# Patient Record
Sex: MALE | Race: WHITE | URBAN - METROPOLITAN AREA
[De-identification: names, ages, dates, MRNs, and addresses within clinical notes are randomized per-mention and may not be internally consistent; named-entity substitution may affect disease eponyms.]

---

## 2018-08-14 ENCOUNTER — OFFICE VISIT (OUTPATIENT)
Dept: FAMILY MEDICINE | Facility: CLINIC | Age: 18
End: 2018-08-14
Payer: COMMERCIAL

## 2018-08-14 VITALS
HEART RATE: 71 BPM | BODY MASS INDEX: 18.37 KG/M2 | OXYGEN SATURATION: 99 % | HEIGHT: 69 IN | DIASTOLIC BLOOD PRESSURE: 79 MMHG | WEIGHT: 124 LBS | TEMPERATURE: 98 F | SYSTOLIC BLOOD PRESSURE: 102 MMHG

## 2018-08-14 DIAGNOSIS — Z00.00 ENCOUNTER FOR WELLNESS EXAMINATION IN ADULT: ICD-10-CM

## 2018-08-14 DIAGNOSIS — F15.91 HISTORY OF METHAMPHETAMINE USE: ICD-10-CM

## 2018-08-14 DIAGNOSIS — Z13.9 SCREENING FOR CONDITION: Primary | ICD-10-CM

## 2018-08-14 DIAGNOSIS — Z23 ENCOUNTER FOR IMMUNIZATION: ICD-10-CM

## 2018-08-14 LAB
CHOLEST SERPL-MCNC: 109 MG/DL
FASTING?: YES
HDLC SERPL-MCNC: 42 MG/DL
HIV 1+2 AB+HIV1 P24 AG SERPL QL IA: NEGATIVE
LDLC SERPL CALC-MCNC: 58 MG/DL
TRIGL SERPL-MCNC: 46 MG/DL

## 2018-08-14 NOTE — PATIENT INSTRUCTIONS
Please return to clinic in 2 months and then again in 6 months for the #2 and #3 (final) doses of the HPV vaccine.

## 2018-08-14 NOTE — PROGRESS NOTES
Chief Complaint   Patient presents with     Establish Care     establish care, testing after drug use     Assessment and Plan   1. History of methamphetamine use  Screening for below due to history of injection meth.  - HIV Ag/Ab Screen [place] ([facility])  - Hepatitis C Antibody ([facility])  - Hepatitis B Surface Ag ([facility])  - Hepatitis B Surface Ab ([facility])    2. Screening for condition  - HIV Ag/Ab Screen [place] ([facility])  - Lipid [place] ([facility]) - Results > 1 hr  - Hepatitis B Surface Ab ([facility])    3. Encounter for wellness examination in adult  Patient agreeable to lipid panel while we are drawing blood, not at increased risk. We are starting HPV series today, he will RTC at 2 and 6 months for #2 and #3.    Options for treatment and follow-up care were reviewed with the patient and/or guardian. Marvel Brantley and/or guardian engaged in the decision making process and verbalized understanding of the options discussed and agreed with the final plan.    Naveen Brady MD  [facility], PGY-2    Precepted with Precepted with: Anny Nunez MD         HPI:   Marvel Brantley is a 18 year old male who presents to clinic today for physical exam.    His main concern is screening for HIV and Hepatitis given his history of meth injection. He has been clean for the past 10 months - just quit cold turkey on his own after coming to the realization that meth wasn't good for him. No alcohol or other drug use, just marijuana.    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 His mother unfortunately passed away on hospice, he is unsure why but probably some form of cancer.    He has no other complaints today.         Review of Systems:   A comprehensive 12 point review of systems was negative unless otherwise noted in the HPI.          PMHX:   Active Problems List  Patient Active Problem List   Diagnosis     History of  "methamphetamine use     Active problem list reviewed and updated.    Current Medications  No current outpatient prescriptions on file.     Medication list reviewed and updated.    Social History  Social History   Substance Use Topics     Smoking status: Never Smoker     Smokeless tobacco: Never Used     Alcohol use No     History   Drug Use     Yes     Special: Marijuana     Allergies  Allergies   Allergen Reactions     Coconut Oil Hives and Swelling     Allergies and Medication Intolerances Updated         Physical Exam:     Vitals:    08/14/18 1123   BP: 102/79   Pulse: 71   Temp: 98  F (36.7  C)   TempSrc: Oral   SpO2: 99%   Weight: 124 lb (56.2 kg)   Height: 5' 9\" (175.3 cm)     Body mass index is 18.31 kg/(m^2).    GENERAL APPEARANCE: alert, appears stated age, no acute distress  HEENT: Eyes grossly normal to inspection, nares normal, and mouth and throat without erythema, ulcers, or lesions  RESP: lungs clear to auscultation - no rales, rhonchi, or wheezes  CV: regular rate and rhythm, no murmur, click, rub, or gallop  ABDOMEN: soft, nontender   MSK: extremities normal, no gross deformities noted, no lower extremity edema  SKIN: Scar on L forearm from abscess drainage, no other suspicious lesions or rashes   NEURO: Normal strength and tone, sensory exam grossly normal, mentation appears intact and speech normal  PSYCH: mood and affect normal/bright   "

## 2018-08-14 NOTE — MR AVS SNAPSHOT
"              After Visit Summary   8/14/2018    Marvel Brantley    MRN: [id number]           Patient Information     Date Of Birth          2000        Visit Information        Provider Department      8/14/2018 11:20 AM Naveen Brady MD [facility]        Today's Diagnoses     Screening for condition    -  1    History of methamphetamine use        Encounter for wellness examination in adult        Encounter for immunization          Care Instructions    Please return to clinic in 2 months and then again in 6 months for the #2 and #3 (final) doses of the HPV vaccine.          Follow-ups after your visit        Who to contact     Please call your clinic at [phone] to:    Ask questions about your health    Make or cancel appointments    Discuss your medicines    Learn about your test results    Speak to your doctor            Additional Information About Your Visit        Care EveryWhere ID     This is your Care EveryWhere ID. This could be used by other organizations to access your [place] medical records  [id number]        Your Vitals Were     Pulse Temperature Height Pulse Oximetry BMI (Body Mass Index)       71 98  F (36.7  C) (Oral) 5' 9\" (175.3 cm) 99% 18.31 kg/m2        Blood Pressure from Last 3 Encounters:   08/14/18 102/79    Weight from Last 3 Encounters:   08/14/18 124 lb (56.2 kg)              We Performed the Following     ADMIN VACCINE, INITIAL     Hepatitis B Surface Ab ([facility])     Hepatitis B Surface Ag ([facility])     Hepatitis C Antibody ([facility])     HIV Ag/Ab Screen [place] ([facility])     HPV9 ([facility] )     Lipid [place] ([facility]) - Results > 1 hr        Primary Care Provider Office Phone # Fax #    Naveen Brady [phone]       [facility] [address] [place] [address]        Equal Access to Services     [facility] NASRIN AH: Hadii mary ku hadasho Soomaali, waaxda luqadaha, qaybta kaalanusha belle " luzgraceseth lezamalatrell waynerafi laanandseth jameson. So [facility] [phone].    ATENCIÓN: Si habla español, tiene a darling disposición servicios gratuitos de asistencia lingüística. Dilshad al [phone].    We comply with applicable federal civil rights laws and [place] laws. We do not discriminate on the basis of race, color, national origin, age, disability, sex, sexual orientation, or gender identity.            Thank you!     Thank you for choosing [facility]  for your care. Our goal is always to provide you with excellent care. Hearing back from our patients is one way we can continue to improve our services. Please take a few minutes to complete the written survey that you may receive in the mail after your visit with us. Thank you!             Your Updated Medication List - Protect others around you: Learn how to safely use, store and throw away your medicines at www.disposemymeds.org.      Notice  As of 8/14/2018 11:59 PM    You have not been prescribed any medications.

## 2018-08-14 NOTE — LETTER
August 16, 2018      Marvel Brantley  [address] [place] [address]        Dear Marvel,    It was nice to meet you in clinic yesterday, below are your test results. Your Hepatitis B test was negative (the positive test indicates that the immunization you received as a child worked and you have defenses against infection - it does not mean you have Hepatitis B). You were negative for Hepatitis C and HIV. Your lipids/cholesterol look excellent. Good luck in the future, please let us know if we can be of assistance in the future!    Please see below for your test results.    Resulted Orders   HIV Ag/Ab Screen [place] ([facility])   Result Value Ref Range    HIV Antigen/Antibody Negative Negative    Narrative    Test performed by:  [facility] [address] [place] [address]  Method is Abbott HIV Ag/Ab for the detection of HIV p24 antigen, HIV-1   antibodies and HIV-2 antibodies.   Hepatitis C Antibody ([facility])   Result Value Ref Range    Hepatitis C Antibody Screen Negative Negative    Narrative    Test performed by:  [facility] [address] [place] [address]   Hepatitis B Surface Ag ([facility])   Result Value Ref Range    Hepatitis B Surface Antigen Negative Negative    Narrative    Test performed by:  [facility] [address] [place] [address]   Lipid [place] ([facility]) - Results > 1 hr   Result Value Ref Range    Cholesterol 109 <=199 mg/dL    Triglycerides 46 <=149 mg/dL    HDL Cholesterol 42 >=40 mg/dL    LDL Cholesterol Calculated 58 <=129 mg/dL    Fasting? Yes     Narrative    Test performed by:  [facility]  [address] [place] [address]   Hepatitis B Surface Ab ([facility])   Result Value Ref Range    Hepatitis B Surface Antibody Positive (A) Negative    Narrative    Test performed by:  [facility] [address] [place] [address]       If you have any questions, please call the clinic to make an  appointment.    Sincerely,    Naveen Brady MD

## 2018-08-15 LAB
HBV SURFACE AB SER-ACNC: POSITIVE M[IU]/ML
HBV SURFACE AG SERPL QL IA: NEGATIVE
HCV AB SER QL: NEGATIVE

## 2018-08-19 NOTE — PROGRESS NOTES
Preceptor Attestation:  Patient's case reviewed and discussed with Naveen Brady MD resident and I evaluated the patient. I agree with written assessment and plan of care.  Supervising Physician:  AAMIR FORMAN MD  [facility]